# Patient Record
(demographics unavailable — no encounter records)

---

## 2023-08-08 NOTE — XMS
Continuity of Care Document
  Created on: 2023
 
 Lani Pinzon
 External Reference #: [id number]
 : 71
 Sex: Female
 
 Demographics
 
 
+-----------------------+------------------------+
| Address               | [address]  |
|                       | [place]  [id number]   |
+-----------------------+------------------------+
| Preferred Language    | Unknown                |
+-----------------------+------------------------+
| Marital Status        | Never           |
+-----------------------+------------------------+
| [employer] Affiliation | Unknown                |
+-----------------------+------------------------+
| Race                  | White                  |
+-----------------------+------------------------+
| Ethnic Group          | Not  or  |
+-----------------------+------------------------+
 
 
 Author
 
 
+--------------+--------------------------+
| Author       | [facility]                 |
+--------------+--------------------------+
| Organization | [facility]                 |
+--------------+--------------------------+
| Address      | [address] |
|              | [place]  [address]     |
+--------------+--------------------------+
| Phone        | [phone]          |
+--------------+--------------------------+
 
 
 
 Care Team Providers
 
 
+-----------------------+-------------+-------------+
| Care Team Member Name | Role        | Phone       |
+-----------------------+-------------+-------------+
 Unavailable | Unavailable |
+-----------------------+-------------+-------------+
 Unavailable | Unavailable |
+-----------------------+-------------+-------------+
 Unavailable | Unavailable |
+-----------------------+-------------+-------------+
 Unavailable | Unavailable |
+-----------------------+-------------+-------------+
 
 
 
 
 Allergies and Intolerances
 
 
+-------------+-----------------+------------+-----------------+-----------------+
|  date       |  description    |  facility  |  reaction       |  severity       |
+-------------+-----------------+------------+-----------------+-----------------+
|  (no date)  |  Aspirin        |  [facility]   |  (no reaction)  |  (no severity)  |
|             |                 | [facility]    |                 |                 |
|             |                 | Hospital   |                 |                 |
+-------------+-----------------+------------+-----------------+-----------------+
|  (no date)  |  aspirin        |  [facility]   |  (no reaction)  |  (no severity)  |
|             |                 | [facility]    |                 |                 |
|             |                 | Hospital   |                 |                 |
+-------------+-----------------+------------+-----------------+-----------------+
|  (no date)  |  Urticaria      |  [facility]   |  (no reaction)  |  (no severity)  |
|             |                 | [facility]    |                 |                 |
|             |                 | Hospital   |                 |                 |
+-------------+-----------------+------------+-----------------+-----------------+
|  (no date)  |  Clindamycin    |  [facility]   |  (no reaction)  |  (no severity)  |
|             |                 | [facility]    |                 |                 |
|             |                 | Hospital   |                 |                 |
+-------------+-----------------+------------+-----------------+-----------------+
|  (no date)  |  clindamycin    |  [facility]   |  (no reaction)  |  (no severity)  |
|             |                 | [facility]    |                 |                 |
|             |                 | Hospital   |                 |                 |
+-------------+-----------------+------------+-----------------+-----------------+
|  (no date)  |  Hydromorphone  |  [facility]   |  (no reaction)  |  (no severity)  |
|             |                 | [facility]    |                 |                 |
|             |                 | Hospital   |                 |                 |
+-------------+-----------------+------------+-----------------+-----------------+
|  (no date)  |  Clindamycin    |  [facility]   |  (no reaction)  |  (no severity)  |
|             |                 | [facility]    |                 |                 |
|             |                 | Hospital   |                 |                 |
+-------------+-----------------+------------+-----------------+-----------------+
|  (no date)  |  Aspirin        |  [facility]   |  (no reaction)  |  (no severity)  |
|             |                 | [facility]    |                 |                 |
|             |                 | Hospital   |                 |                 |
+-------------+-----------------+------------+-----------------+-----------------+
|  (no date)  |  Clindamycin    |  [facility]   |  (no reaction)  |  (no severity)  |
|             |                 | [facility]    |                 |                 |
|             |                 | Hospital   |                 |                 |
+-------------+-----------------+------------+-----------------+-----------------+
|  (no date)  |  Hydromorphone  |  [facility]   |  (no reaction)  |  (no severity)  |
|             |                 | [facility]    |                 |                 |
|             |                 | Hospital   |                 |                 |
+-------------+-----------------+------------+-----------------+-----------------+
|  (no date)  |  Penicillin     |  [facility]   |  (no reaction)  |  (no severity)  |
|             |                 | [facility]    |                 |                 |
|             |                 | Hospital   |                 |                 |
+-------------+-----------------+------------+-----------------+-----------------+
|  (no date)  |  Penicillin     |  [facility]   |  (no reaction)  |  (no severity)  |
|             |                 | [facility]    |                 |                 |
|             |                 | Hospital   |                 |                 |
+-------------+-----------------+------------+-----------------+-----------------+
|  (no date)  |  Penicillins    |  [facility]       |  (no reaction)  |  (no severity)  |
+-------------+-----------------+------------+-----------------+-----------------+
|  (no date)  |  Sulfa          |  [facility]       |  (no reaction)  |  (no severity)  |
|             | (Sulfonamide    |            |                 |                 |
 
|             | Antibiotics)    |            |                 |                 |
+-------------+-----------------+------------+-----------------+-----------------+
|  (no date)  |  aspirin        |  [facility]       |  (no reaction)  |  (no severity)  |
+-------------+-----------------+------------+-----------------+-----------------+
|  (no date)  |  clindamycin    |  [facility]       |  (no reaction)  |  (no severity)  |
+-------------+-----------------+------------+-----------------+-----------------+
|  (no date)  |  hydromorphone  |  [facility]       |  (no reaction)  |  (no severity)  |
+-------------+-----------------+------------+-----------------+-----------------+
|  (no date)  |  Penicillin     |  [facility]   |  (no reaction)  |  (no severity)  |
|             |                 | [facility]    |                 |                 |
|             |                 | Hospital   |                 |                 |
+-------------+-----------------+------------+-----------------+-----------------+
|  (no date)  |  NARCOTICS      |  [facility]       |  (no reaction)  |  (no severity)  |
+-------------+-----------------+------------+-----------------+-----------------+
|  (no date)  |  Penicillin     |  [facility]   |  (no reaction)  |  (no severity)  |
|             |                 | [facility]    |                 |                 |
|             |                 | Hospital   |                 |                 |
+-------------+-----------------+------------+-----------------+-----------------+
|  (no date)  |  Hydromorphone  |  [facility]   |  (no reaction)  |  (no severity)  |
|             |                 | [facility]    |                 |                 |
|             |                 | Hospital   |                 |                 |
+-------------+-----------------+------------+-----------------+-----------------+
|  (no date)  |  Aspirin        |  [facility]   |  (no reaction)  |  (no severity)  |
|             |                 | [facility]    |                 |                 |
|             |                 | Hospital   |                 |                 |
+-------------+-----------------+------------+-----------------+-----------------+
 
 
 
 Encounters
 No information.
 
 Functional Status
 No information.
 
 Immunizations
 No information.
 
 Medications
 
 
+--------------------+--------------------------+----------------------------+
|  date              |  description             |  facility                  |
+--------------------+--------------------------+----------------------------+
|  2023 00:00  |  OXYMETAZOLINE HCL       |  [facility]  |
+--------------------+--------------------------+----------------------------+
|  2023 00:00  |  OXYMETAZOLINE HCL       |  [facility]  |
+--------------------+--------------------------+----------------------------+
|  2023 00:00  |  Fluticasone Furoate     |  [facility]  |
+--------------------+--------------------------+----------------------------+
|  2023 00:00  |  Fluticasone Furoate     |  [facility]  |
+--------------------+--------------------------+----------------------------+
|  2022 00:00  |  NITROFURANTOIN MONOHYD  |  [facility]  |
|                    | MACROCR                  |                            |
+--------------------+--------------------------+----------------------------+
|  2023 00:00  |  LORATADINE              |  [facility]  |
+--------------------+--------------------------+----------------------------+
|  2023 00:00  |  LORATADINE              |  [facility]  |
+--------------------+--------------------------+----------------------------+
|  2022 00:00  |  HYDROCODONE             |  [facility]  |
 
|                    | BIT/ACETAMINOPHEN        |                            |
+--------------------+--------------------------+----------------------------+
 
 
 
 Problems
 
 
+--------------------+-----------------------------+----------------------------+
|  date              |  description                |  facility                  |
+--------------------+-----------------------------+----------------------------+
|  2021 00:00  |  Acute gastroenteritis      |  [facility]  |
+--------------------+-----------------------------+----------------------------+
|  2021 00:00  |  Acute gastroenteritis      |  [facility]  |
+--------------------+-----------------------------+----------------------------+
|  2022 18:20  |  PRIMARY OSTEOARTHRITIS,    |  [facility]                       |
|                    | LEFT ANKLE AND FOOT         |                            |
+--------------------+-----------------------------+----------------------------+
|  2022 18:20  |  PAIN IN LEFT ANKLE AND     |  [facility]                       |
|                    | JOINTS OF LEFT FOOT         |                            |
+--------------------+-----------------------------+----------------------------+
|  2022 00:00  |  Urinary tract infection    |  [facility]  |
+--------------------+-----------------------------+----------------------------+
|  2022 00:00  |  Urinary tract infection    |  [facility]  |
+--------------------+-----------------------------+----------------------------+
|  2022 20:48  |  NICOTINE DEPENDENCE,       |  [facility]                       |
|                    | UNSPECIFIED, UNCOMPLICATED  |                            |
+--------------------+-----------------------------+----------------------------+
|  2022 20:48  |  URINARY TRACT INFECTION,   |  [facility]                       |
|                    | SITE NOT SPECIFIED          |                            |
+--------------------+-----------------------------+----------------------------+
|  2022 20:48  |  DYSURIA                    |  [facility]                       |
+--------------------+-----------------------------+----------------------------+
|  2022 20:48  |  ALLERGY STATUS TO          |  [facility]                       |
|                    | PENICILLIN                  |                            |
+--------------------+-----------------------------+----------------------------+
|  2022 20:48  |  ALLERGY STATUS TO OTHER    |  [facility]                       |
|                    | ANTIBIOTIC AGENTS STATUS    |                            |
+--------------------+-----------------------------+----------------------------+
|  2022 20:48  |  ALLERGY STATUS TO OTH      |  [facility]                       |
|                    | DRUG/MEDS/BIOL SUBST STATUS |                            |
|                    |                             |                            |
+--------------------+-----------------------------+----------------------------+
|  2022-10-20 09:00  |  ENCNTR SCREEN MAMMOGRAM    |  [facility]                       |
|                    | FOR MALIGNANT NE            |                            |
+--------------------+-----------------------------+----------------------------+
|  2023 15:46  |  ENCOUNTER FOR OTHER        |  [facility]                       |
|                    | PREPROCEDURAL EXAMINATION   |                            |
+--------------------+-----------------------------+----------------------------+
|  2023 15:46  |  FAMILY HISTORY OF COLONIC  |  [facility]                       |
|                    | POLYPS                      |                            |
+--------------------+-----------------------------+----------------------------+
|  2023 05:45  |  NICOTINE DEPENDENCE,       |  [facility]                       |
|                    | UNSPECIFIED, UNCOMP         |                            |
+--------------------+-----------------------------+----------------------------+
|  2023 05:45  |  DVRTCLOS OF LG INT W/O     |  [facility]                       |
|                    | PERFORATION OR ABSCESS W/O  |                            |
+--------------------+-----------------------------+----------------------------+
|  2023 05:45  |  RESIDUAL HEMORRHOIDAL SKIN |  [facility]                       |
|                    |  TAGS                       |                            |
 
+--------------------+-----------------------------+----------------------------+
|  2023 05:45  |  OTHER HEMORRHOIDS          |  [facility]                       |
+--------------------+-----------------------------+----------------------------+
|  2023 05:45  |  URTICARIA, UNSPECIFIED     |  [facility]                       |
+--------------------+-----------------------------+----------------------------+
|  2023 05:45  |  OTHER SPECIFIED CONGENITAL |  [facility]                       |
|                    |  MALFORMATIONS OF INTEST    |                            |
+--------------------+-----------------------------+----------------------------+
|  2023 05:45  |  DYSLEXIA AND ALEXIA        |  [facility]                       |
+--------------------+-----------------------------+----------------------------+
|  2023 05:45  |  ENCOUNTER FOR SCREENING    |  [facility]                       |
|                    | FOR MALIGNANT NEOPLASM OF   |                            |
|                    | COLON                       |                            |
+--------------------+-----------------------------+----------------------------+
|  2023 05:45  |  OTHER LONG TERM (CURRENT)  |  [facility]                       |
|                    | DRUG THERAPY                |                            |
+--------------------+-----------------------------+----------------------------+
|  2023 05:45  |  FAMILY HISTORY OF          |  [facility]                       |
|                    | MALIGNANT NEOPLASM OF       |                            |
|                    | DIGESTIVE                   |                            |
+--------------------+-----------------------------+----------------------------+
|  2023 05:45  |  FAMILY HISTORY OF COLONIC  |  [facility]                       |
|                    | POLYPS                      |                            |
+--------------------+-----------------------------+----------------------------+
|  2023 05:45  |  ALLERGY STATUS TO OTHER    |  [facility]                       |
|                    | ANTIBIOTIC AGENTS STATUS    |                            |
+--------------------+-----------------------------+----------------------------+
|  2023 05:45  |  ALLERGY STATUS TO          |  [facility]                       |
|                    | SULFONAMIDES STATUS         |                            |
+--------------------+-----------------------------+----------------------------+
|  2023 05:45  |  ALLERGY STATUS TO OTH      |  [facility]                       |
|                    | DRUG/MEDS/BIOL SUBST STATUS |                            |
|                    |                             |                            |
+--------------------+-----------------------------+----------------------------+
 
 
 
 Procedures
 
 
+--------------------+---------------+----------------------------+
|  date              |  description  |  facility                  |
+--------------------+---------------+----------------------------+
|  2023 00:00  |  Colonoscopy  |  [facility]  |
+--------------------+---------------+----------------------------+
|  2023 00:00  |  Colonoscopy  |  [facility]  |
+--------------------+---------------+----------------------------+
 
 
 
 Results/Labs
 
 
+--------+--------+------------+---------+--------+---------+
|  test  |  date  |  facility  |  value  |  unit  |  notes  |
+--------+--------+------------+---------+--------+---------+
 
 
 
+------------------+
 
|  Result panel 1  |
+------------------+
 
 
 
+--------------+--------------+-----------+-------+-------------+-------------+
|              |  2022  |  [facility]  |  RED  |  (missing)  |  (missing)  |
| (unavailable | 21:00        | [facility]   |       |             |             |
| )            |              | Hospital  |       |             |             |
+--------------+--------------+-----------+-------+-------------+-------------+
 
 
 
+------------------+
|  Result panel 2  |
+------------------+
 
 
 
+--------------+--------------+-----------+-------------+-------------+-------------+
|              |  2022  |  [facility]  |  SL CLOUDY  |  (missing)  |  (missing)  |
| (unavailable | 21:00        | [facility]   |             |             |             |
| )            |              | Hospital  |             |             |             |
+--------------+--------------+-----------+-------------+-------------+-------------+
 
 
 
+------------------+
|  Result panel 3  |
+------------------+
 
 
 
+--------------+--------------+-----------+------------+-------------+-------------+
|              |  2022  |  [facility]  |  MODERATE  |  (missing)  |  (missing)  |
| (unavailable | 21:00        | [facility]   |            |             |             |
| )            |              | Hospital  |            |             |             |
+--------------+--------------+-----------+------------+-------------+-------------+
 
 
 
+------------------+
|  Result panel 4  |
+------------------+
 
 
 
+--------------+--------------+-----------+------------+-------------+-------------+
|              |  2022  |  [facility]  |  NEGATIVE  |  (missing)  |  (missing)  |
| (unavailable | 21:00        | [facility]   |            |             |             |
| )            |              | Hospital  |            |             |             |
+--------------+--------------+-----------+------------+-------------+-------------+
 
 
 
+------------------+
|  Result panel 5  |
+------------------+
 
 
 
 
+--------------+--------------+-----------+---------+-------------+-------------+
|              |  2022  |  [facility]  |  TRACE  |  (missing)  |  (missing)  |
| (unavailable | 21:00        | [facility]   |         |             |             |
| )            |              | Hospital  |         |             |             |
+--------------+--------------+-----------+---------+-------------+-------------+
 
 
 
+------------------+
|  Result panel 6  |
+------------------+
 
 
 
+--------------+--------------+-----------+---------+-------------+-------------+
|              |  2022  |  [facility]  |  1.025  |  (missing)  |  (missing)  |
| (unavailable | 21:00        | [facility]   |         |             |             |
| )            |              | Hospital  |         |             |             |
+--------------+--------------+-----------+---------+-------------+-------------+
 
 
 
+------------------+
|  Result panel 7  |
+------------------+
 
 
 
+--------------+--------------+-----------+-----------+-------------+-------------+
|              |  2022  |  [facility]  |  TRACE-I  |  (missing)  |  (missing)  |
| (unavailable | 21:00        | [facility]   |           |             |             |
| )            |              | Hospital  |           |             |             |
+--------------+--------------+-----------+-----------+-------------+-------------+
 
 
 
+------------------+
|  Result panel 8  |
+------------------+
 
 
 
+--------------+--------------+-----------+-------+-------------+-------------+
|              |  2022  |  [facility]  |  5.0  |  (missing)  |  (missing)  |
| (unavailable | 21:00        | [facility]   |       |             |             |
| )            |              | Hospital  |       |             |             |
+--------------+--------------+-----------+-------+-------------+-------------+
 
 
 
+------------------+
|  Result panel 9  |
+------------------+
 
 
 
+--------------+--------------+-----------+---------+-------------+-------------+
|              |  2022  |  [facility]  |  >=300  |  (missing)  |  (missing)  |
| (unavailable | 21:00        | [facility]   |         |             |             |
 
| )            |              | Hospital  |         |             |             |
+--------------+--------------+-----------+---------+-------------+-------------+
 
 
 
+-------------------+
|  Result panel 10  |
+-------------------+
 
 
 
+--------------+--------------+-----------+---------+-------------+-------------+
|              |  2022  |  [facility]  |  >=8.0  |  (missing)  |  (missing)  |
| (unavailable | 21:00        | [facility]   |         |             |             |
| )            |              | Hospital  |         |             |             |
+--------------+--------------+-----------+---------+-------------+-------------+
 
 
 
+-------------------+
|  Result panel 11  |
+-------------------+
 
 
 
+--------------+--------------+-----------+------------+-------------+-------------+
|              |  2022  |  [facility]  |  POSITIVE  |  (missing)  |  (missing)  |
| (unavailable | 21:00        | [facility]   |            |             |             |
| )            |              | Hospital  |            |             |             |
+--------------+--------------+-----------+------------+-------------+-------------+
 
 
 
+-------------------+
|  Result panel 12  |
+-------------------+
 
 
 
+--------------+--------------+-----------+---------+-------------+-------------+
|              |  2022  |  [facility]  |  SMALL  |  (missing)  |  (missing)  |
| (unavailable | 21:00        | [facility]   |         |             |             |
| )            |              | Hospital  |         |             |             |
+--------------+--------------+-----------+---------+-------------+-------------+
 
 
 
+-------------------+
|  Result panel 13  |
+-------------------+
 
 
 
+--------------+--------------+-----------+-------+-------------+-------------+
|              |  2022  |  [facility]  |  2-3  |  (missing)  |  (missing)  |
| (unavailable | 21:00        | [facility]   |       |             |             |
| )            |              | Hospital  |       |             |             |
+--------------+--------------+-----------+-------+-------------+-------------+
 
 
 
 
+-------------------+
|  Result panel 14  |
+-------------------+
 
 
 
+--------------+--------------+-----------+---------+-------------+-------------+
|              |  2022  |  [facility]  |  12-20  |  (missing)  |  (missing)  |
| (unavailable | 21:00        | [facility]   |         |             |             |
| )            |              | Hospital  |         |             |             |
+--------------+--------------+-----------+---------+-------------+-------------+
 
 
 
+-------------------+
|  Result panel 15  |
+-------------------+
 
 
 
+--------------+--------------+-----------+--------------+-------------+-------------+
|              |  2022  |  [facility]  |  SQUAMOUS 2+ |  (missing)  |  (missing)  |
| (unavailable | 21:00        | [facility]   |              |             |             |
| )            |              | Hospital  |              |             |             |
+--------------+--------------+-----------+--------------+-------------+-------------+
 
 
 
+-------------------+
|  Result panel 16  |
+-------------------+
 
 
 
+--------------+--------------+-----------+-------------+-------------+-------------+
|              |  2022  |  [facility]  |  NONE SEEN  |  (missing)  |  (missing)  |
| (unavailable | 21:00        | [facility]   |             |             |             |
| )            |              | Hospital  |             |             |             |
+--------------+--------------+-----------+-------------+-------------+-------------+
 
 
 
+-------------------+
|  Result panel 17  |
+-------------------+
 
 
 
+--------------+--------------+-----------+--------+-------------+-------------+
|              |  2022  |  [facility]  |  RARE  |  (missing)  |  (missing)  |
| (unavailable | 21:00        | [facility]   |        |             |             |
| )            |              | Hospital  |        |             |             |
+--------------+--------------+-----------+--------+-------------+-------------+
 
 
 
+-------------------+
|  Result panel 18  |
+-------------------+
 
 
 
 
+--------------+--------------+-----------+-------------+-------------+-------------+
|              |  2022  |  [facility]  |  NONE SEEN  |  (missing)  |  (missing)  |
| (unavailable | 21:00        | [facility]   |             |             |             |
| )            |              | Hospital  |             |             |             |
+--------------+--------------+-----------+-------------+-------------+-------------+
 
 
 
+-------------------+
|  Result panel 19  |
+-------------------+
 
 
 
+--------------+--------------+-----------+-------+-------------+-------------+
|              |  2022  |  [facility]  |  Yes  |  (missing)  |  (missing)  |
| (unavailable | 21:00        | [facility]   |       |             |             |
| )            |              | Hospital  |       |             |             |
+--------------+--------------+-----------+-------+-------------+-------------+
 
 
 
+-------------------+
|  Result panel 20  |
+-------------------+
 
 
 
+--------------+--------------+-----------+--------------+-------------+-------------+
|              |  2022  |  [facility]  |  CLEAN CATCH |  (missing)  |  (missing)  |
| (unavailable | 21:00        | [facility]   |              |             |             |
| )            |              | Hospital  |              |             |             |
+--------------+--------------+-----------+--------------+-------------+-------------+
 
 
 
+-------------------+
|  Result panel 21  |
+-------------------+
 
 
 
+--------------+--------------+-----------+--------+-------------+-------------+
|              |  2023  |  [facility]  |  10.0  |  (missing)  |  (missing)  |
| (unavailable | 16:53:07     | [facility]   |        |             |             |
| )            |              | Hospital  |        |             |             |
+--------------+--------------+-----------+--------+-------------+-------------+
 
 
 
+-------------------+
|  Result panel 22  |
+-------------------+
 
 
 
+--------------+--------------+-----------+--------+-------------+-------------+
 
|              |  2023  |  [facility]  |  65.6  |  (missing)  |  (missing)  |
| (unavailable | 16:53:07     | [facility]   |        |             |             |
| )            |              | Hospital  |        |             |             |
+--------------+--------------+-----------+--------+-------------+-------------+
 
 
 
+-------------------+
|  Result panel 23  |
+-------------------+
 
 
 
+--------------+--------------+-----------+--------+-------------+-------------+
|              |  2023  |  [facility]  |  23.3  |  (missing)  |  (missing)  |
| (unavailable | 16:53:07     | [facility]   |        |             |             |
| )            |              | Hospital  |        |             |             |
+--------------+--------------+-----------+--------+-------------+-------------+
 
 
 
+-------------------+
|  Result panel 24  |
+-------------------+
 
 
 
+--------------+--------------+-----------+-------+-------------+-------------+
|              |  2023  |  [facility]  |  9.4  |  (missing)  |  (missing)  |
| (unavailable | 16:53:07     | [facility]   |       |             |             |
| )            |              | Hospital  |       |             |             |
+--------------+--------------+-----------+-------+-------------+-------------+
 
 
 
+-------------------+
|  Result panel 25  |
+-------------------+
 
 
 
+--------------+--------------+-----------+-------+-------------+-------------+
|              |  2023  |  [facility]  |  1.2  |  (missing)  |  (missing)  |
| (unavailable | 16:53:07     | [facility]   |       |             |             |
| )            |              | Hospital  |       |             |             |
+--------------+--------------+-----------+-------+-------------+-------------+
 
 
 
+-------------------+
|  Result panel 26  |
+-------------------+
 
 
 
+--------------+--------------+-----------+-------+-------------+-------------+
|              |  2023  |  [facility]  |  0.5  |  (missing)  |  (missing)  |
| (unavailable | 16:53:07     | [facility]   |       |             |             |
| )            |              | Hospital  |       |             |             |
+--------------+--------------+-----------+-------+-------------+-------------+
 
 
 
 
+-------------------+
|  Result panel 27  |
+-------------------+
 
 
 
+--------------+--------------+-----------+------+---------+-------------+
|              |  2023  |  [facility]  |  88  |  mg/dL  |  (missing)  |
| (unavailable | 16:53:07     | [facility]   |      |         |             |
| )            |              | Hospital  |      |         |             |
+--------------+--------------+-----------+------+---------+-------------+
 
 
 
+-------------------+
|  Result panel 28  |
+-------------------+
 
 
 
+--------------+--------------+-----------+------+---------+-------------+
|              |  2023  |  [facility]  |  15  |  mg/dL  |  (missing)  |
| (unavailable | 16:53:07     | [facility]   |      |         |             |
| )            |              | Hospital  |      |         |             |
+--------------+--------------+-----------+------+---------+-------------+
 
 
 
+-------------------+
|  Result panel 29  |
+-------------------+
 
 
 
+--------------+--------------+-----------+--------+---------+-------------+
|              |  2023  |  [facility]  |  1.15  |  mg/dL  |  (missing)  |
| (unavailable | 16:53:07     | [facility]   |        |         |             |
| )            |              | Hospital  |        |         |             |
+--------------+--------------+-----------+--------+---------+-------------+
 
 
 
+-------------------+
|  Result panel 30  |
+-------------------+
 
 
 
+--------------+--------------+-----------+------+-------------+-------------+
|              |  2023  |  [facility]  |  58  |  (missing)  |  (missing)  |
| (unavailable | 16:53:07     | [facility]   |      |             |             |
| )            |              | Hospital  |      |             |             |
+--------------+--------------+-----------+------+-------------+-------------+
 
 
 
+-------------------+
 
|  Result panel 31  |
+-------------------+
 
 
 
+--------------+--------------+-----------+---------+-------------+-------------+
|              |  2023  |  [facility]  |  13.04  |  (missing)  |  (missing)  |
| (unavailable | 16:53:07     | [facility]   |         |             |             |
| )            |              | Hospital  |         |             |             |
+--------------+--------------+-----------+---------+-------------+-------------+
 
 
 
+-------------------+
|  Result panel 32  |
+-------------------+
 
 
 
+--------------+--------------+-----------+--------+-------------+-------------+
|              |  2023  |  [facility]  |  4.27  |  (missing)  |  (missing)  |
| (unavailable | 16:53:07     | [facility]   |        |             |             |
| )            |              | Hospital  |        |             |             |
+--------------+--------------+-----------+--------+-------------+-------------+
 
 
 
+-------------------+
|  Result panel 33  |
+-------------------+
 
 
 
+--------------+--------------+-----------+-------+-------------+-------------+
|              |  2023  |  [facility]  |  143  |  (missing)  |  (missing)  |
| (unavailable | 16:53:07     | [facility]   |       |             |             |
| )            |              | Hospital  |       |             |             |
+--------------+--------------+-----------+-------+-------------+-------------+
 
 
 
+-------------------+
|  Result panel 34  |
+-------------------+
 
 
 
+--------------+--------------+-----------+-------+-------------+-------------+
|              |  2023  |  [facility]  |  3.5  |  (missing)  |  (missing)  |
| (unavailable | 16:53:07     | [facility]   |       |             |             |
| )            |              | Hospital  |       |             |             |
+--------------+--------------+-----------+-------+-------------+-------------+
 
 
 
+-------------------+
|  Result panel 35  |
+-------------------+
 
 
 
 
+--------------+--------------+-----------+-------+-------------+-------------+
|              |  2023  |  [facility]  |  103  |  (missing)  |  (missing)  |
| (unavailable | 16:53:07     | [facility]   |       |             |             |
| )            |              | Hospital  |       |             |             |
+--------------+--------------+-----------+-------+-------------+-------------+
 
 
 
+-------------------+
|  Result panel 36  |
+-------------------+
 
 
 
+--------------+--------------+-----------+------+-------------+-------------+
|              |  2023  |  [facility]  |  29  |  (missing)  |  (missing)  |
| (unavailable | 16:53:07     | [facility]   |      |             |             |
| )            |              | Hospital  |      |             |             |
+--------------+--------------+-----------+------+-------------+-------------+
 
 
 
+-------------------+
|  Result panel 37  |
+-------------------+
 
 
 
+--------------+--------------+-----------+--------+-------------+-------------+
|              |  2023  |  [facility]  |  14.5  |  (missing)  |  (missing)  |
| (unavailable | 16:53:07     | [facility]   |        |             |             |
| )            |              | Hospital  |        |             |             |
+--------------+--------------+-----------+--------+-------------+-------------+
 
 
 
+-------------------+
|  Result panel 38  |
+-------------------+
 
 
 
+--------------+--------------+-----------+-------+---------+-------------+
|              |  2023  |  [facility]  |  8.7  |  mg/dL  |  (missing)  |
| (unavailable | 16:53:07     | [facility]   |       |         |             |
| )            |              | Hospital  |       |         |             |
+--------------+--------------+-----------+-------+---------+-------------+
 
 
 
+-------------------+
|  Result panel 39  |
+-------------------+
 
 
 
+--------------+--------------+-----------+-------+-------------+-------------+
|              |  2023  |  [facility]  |  7.6  |  (missing)  |  (missing)  |
| (unavailable | 16:53:07     | [facility]   |       |             |             |
 
| )            |              | Hospital  |       |             |             |
+--------------+--------------+-----------+-------+-------------+-------------+
 
 
 
+-------------------+
|  Result panel 40  |
+-------------------+
 
 
 
+--------------+--------------+-----------+-------+-------------+-------------+
|              |  2023  |  [facility]  |  3.9  |  (missing)  |  (missing)  |
| (unavailable | 16:53:07     | [facility]   |       |             |             |
| )            |              | Hospital  |       |             |             |
+--------------+--------------+-----------+-------+-------------+-------------+
 
 
 
+-------------------+
|  Result panel 41  |
+-------------------+
 
 
 
+--------------+--------------+-----------+-------+-------------+-------------+
|              |  2023  |  [facility]  |  3.7  |  (missing)  |  (missing)  |
| (unavailable | 16:53:07     | [facility]   |       |             |             |
| )            |              | Hospital  |       |             |             |
+--------------+--------------+-----------+-------+-------------+-------------+
 
 
 
+-------------------+
|  Result panel 42  |
+-------------------+
 
 
 
+--------------+--------------+-----------+--------+-------------+-------------+
|              |  2023  |  [facility]  |  1.05  |  (missing)  |  (missing)  |
| (unavailable | 16:53:07     | [facility]   |        |             |             |
| )            |              | Hospital  |        |             |             |
+--------------+--------------+-----------+--------+-------------+-------------+
 
 
 
+-------------------+
|  Result panel 43  |
+-------------------+
 
 
 
+--------------+--------------+-----------+--------+-------------+-------------+
|              |  2023  |  [facility]  |  13.4  |  (missing)  |  (missing)  |
| (unavailable | 16:53:07     | [facility]   |        |             |             |
| )            |              | Hospital  |        |             |             |
+--------------+--------------+-----------+--------+-------------+-------------+
 
 
 
 
+-------------------+
|  Result panel 44  |
+-------------------+
 
 
 
+--------------+--------------+-----------+-------+-------------+-------------+
|              |  2023  |  [facility]  |  0.4  |  (missing)  |  (missing)  |
| (unavailable | 16:53:07     | [facility]   |       |             |             |
| )            |              | Hospital  |       |             |             |
+--------------+--------------+-----------+-------+-------------+-------------+
 
 
 
+-------------------+
|  Result panel 45  |
+-------------------+
 
 
 
+--------------+--------------+-----------+------+-------------+-------------+
|              |  2023  |  [facility]  |  18  |  (missing)  |  (missing)  |
| (unavailable | 16:53:07     | [facility]   |      |             |             |
| )            |              | Hospital  |      |             |             |
+--------------+--------------+-----------+------+-------------+-------------+
 
 
 
+-------------------+
|  Result panel 46  |
+-------------------+
 
 
 
+--------------+--------------+-----------+------+-------------+-------------+
|              |  2023  |  [facility]  |  30  |  (missing)  |  (missing)  |
| (unavailable | 16:53:07     | [facility]   |      |             |             |
| )            |              | Hospital  |      |             |             |
+--------------+--------------+-----------+------+-------------+-------------+
 
 
 
+-------------------+
|  Result panel 47  |
+-------------------+
 
 
 
+--------------+--------------+-----------+------+-------------+-------------+
|              |  2023  |  [facility]  |  97  |  (missing)  |  (missing)  |
| (unavailable | 16:53:07     | [facility]   |      |             |             |
| )            |              | Hospital  |      |             |             |
+--------------+--------------+-----------+------+-------------+-------------+
 
 
 
+-------------------+
|  Result panel 48  |
+-------------------+
 
 
 
 
+--------------+--------------+-----------+--------+-------------+-------------+
|              |  2023  |  [facility]  |  39.1  |  (missing)  |  (missing)  |
| (unavailable | 16:53:07     | [facility]   |        |             |             |
| )            |              | Hospital  |        |             |             |
+--------------+--------------+-----------+--------+-------------+-------------+
 
 
 
+-------------------+
|  Result panel 49  |
+-------------------+
 
 
 
+--------------+--------------+-----------+--------+-------------+-------------+
|              |  2023  |  [facility]  |  91.6  |  (missing)  |  (missing)  |
| (unavailable | 16:53:07     | [facility]   |        |             |             |
| )            |              | Hospital  |        |             |             |
+--------------+--------------+-----------+--------+-------------+-------------+
 
 
 
+-------------------+
|  Result panel 50  |
+-------------------+
 
 
 
+--------------+--------------+-----------+--------+-------------+-------------+
|              |  2023  |  [facility]  |  31.5  |  (missing)  |  (missing)  |
| (unavailable | 16:53:07     | [facility]   |        |             |             |
| )            |              | Hospital  |        |             |             |
+--------------+--------------+-----------+--------+-------------+-------------+
 
 
 
+-------------------+
|  Result panel 51  |
+-------------------+
 
 
 
+--------------+--------------+-----------+--------+-------------+-------------+
|              |  2023  |  [facility]  |  34.3  |  (missing)  |  (missing)  |
| (unavailable | 16:53:07     | [facility]   |        |             |             |
| )            |              | Hospital  |        |             |             |
+--------------+--------------+-----------+--------+-------------+-------------+
 
 
 
+-------------------+
|  Result panel 52  |
+-------------------+
 
 
 
+--------------+--------------+-----------+--------+-------------+-------------+
 
|              |  2023  |  [facility]  |  13.1  |  (missing)  |  (missing)  |
| (unavailable | 16:53:07     | [facility]   |        |             |             |
| )            |              | Hospital  |        |             |             |
+--------------+--------------+-----------+--------+-------------+-------------+
 
 
 
+-------------------+
|  Result panel 53  |
+-------------------+
 
 
 
+--------------+--------------+-----------+-------+-------------+-------------+
|              |  2023  |  [facility]  |  329  |  (missing)  |  (missing)  |
| (unavailable | 16:53:07     | [facility]   |       |             |             |
| )            |              | Hospital  |       |             |             |
+--------------+--------------+-----------+-------+-------------+-------------+
 
 
 
 Social History
 No information.
 
 Vital Signs
 
 
+--------------------+---------------------+----------+---------+
|  date              |  measurement        |  value   |  units  |
+--------------------+---------------------+----------+---------+
|  2022 00:00  |  BMI                |  31.2    |  kg/m2  |
+--------------------+---------------------+----------+---------+
|  2022 00:00  |  BP_diastolic       |  51      |  mmHg   |
+--------------------+---------------------+----------+---------+
|  2022 00:00  |  BP_systolic        |  106     |  mmHg   |
+--------------------+---------------------+----------+---------+
|  2022 00:00  |  heart_rate         |  71      |  /min   |
+--------------------+---------------------+----------+---------+
|  2022 00:00  |  height_metric      |  152.4   |  cm     |
+--------------------+---------------------+----------+---------+
|  2022 00:00  |  height_standard    |  60      |  in     |
+--------------------+---------------------+----------+---------+
|  2022 00:00  |  o2_saturation      |  96      |  %      |
+--------------------+---------------------+----------+---------+
|  2022 00:00  |  respiration_rate   |  16      |  /min   |
+--------------------+---------------------+----------+---------+
|  2022 00:00  |  temperature_metric |  37.22   |  C      |
|                    |                     |          |         |
+--------------------+---------------------+----------+---------+
|  2022 00:00  |                     |  99      |  F      |
|                    | temperature_standar |          |         |
|                    | d                   |          |         |
+--------------------+---------------------+----------+---------+
|  2022 00:00  |  weight_metric      |  72.57   |  kg     |
+--------------------+---------------------+----------+---------+
|  2022 00:00  |  weight_standard    |  159.99  |  lb     |
+--------------------+---------------------+----------+---------+
|  2022 00:00  |  weight_standard    |  160     |  lb     |
+--------------------+---------------------+----------+---------+
|  2023 00:00  |  BMI                |  25.9    |  kg/m2  |
 
+--------------------+---------------------+----------+---------+
|  2023 00:00  |  height_metric      |  167.64  |  cm     |
+--------------------+---------------------+----------+---------+
|  2023 00:00  |  height_standard    |  66      |  in     |
+--------------------+---------------------+----------+---------+
|  2023 00:00  |  weight_metric      |  72.7    |  kg     |
+--------------------+---------------------+----------+---------+
|  2023 00:00  |  weight_standard    |  160.28  |  lb     |
+--------------------+---------------------+----------+---------+
|  2023 00:00  |  BP_diastolic       |  51      |  mmHg   |
+--------------------+---------------------+----------+---------+
|  2023 00:00  |  BP_systolic        |  110     |  mmHg   |
+--------------------+---------------------+----------+---------+
|  2023 00:00  |  heart_rate         |  69      |  /min   |
+--------------------+---------------------+----------+---------+
|  2023 00:00  |  o2_saturation      |  100     |  %      |
+--------------------+---------------------+----------+---------+
|  2023 00:00  |  respiration_rate   |  14      |  /min   |
+--------------------+---------------------+----------+---------+
|  2023 00:00  |  temperature_metric |  36.89   |  C      |
|                    |                     |          |         |
+--------------------+---------------------+----------+---------+
|  2023 00:00  |                     |  98.4    |  F      |
|                    | temperature_standar |          |         |
|                    | d                   |          |         |
+--------------------+---------------------+----------+---------+
|  2023 00:00  |  BMI                |  26.7    |  kg/m2  |
+--------------------+---------------------+----------+---------+
|  2023 00:00  |  BP_diastolic       |  84      |  mmHg   |
+--------------------+---------------------+----------+---------+
|  2023 00:00  |  BP_systolic        |  103     |  mmHg   |
+--------------------+---------------------+----------+---------+
|  2023 00:00  |  heart_rate         |  76      |  /min   |
+--------------------+---------------------+----------+---------+
|  2023 00:00  |  height_metric      |  167.64  |  cm     |
+--------------------+---------------------+----------+---------+
|  2023 00:00  |  height_standard    |  66      |  in     |
+--------------------+---------------------+----------+---------+
|  2023 00:00  |  o2_saturation      |  98      |  %      |
+--------------------+---------------------+----------+---------+
|  2023 00:00  |  respiration_rate   |  18      |  /min   |
+--------------------+---------------------+----------+---------+
|  2023 00:00  |  temperature_metric |  36.56   |  C      |
|                    |                     |          |         |
+--------------------+---------------------+----------+---------+
|  2023 00:00  |                     |  97.8    |  F      |
|                    | temperature_standar |          |         |
|                    | d                   |          |         |
+--------------------+---------------------+----------+---------+
|  2023 00:00  |  weight_metric      |  75      |  kg     |
+--------------------+---------------------+----------+---------+
|  2023 00:00  |  weight_standard    |  165.35  |  lb     |
+--------------------+---------------------+----------+---------+

## 2023-08-08 NOTE — XMS
Continuity of Care Document
  Created on: 2023
 
 Lani Pinzon
 External Reference #: [id number]
 : 71
 Sex: Female
 
 Demographics
 
 
+-----------------------+------------------------+
| Address               | [address]  |
|                       | [place]  [address]   |
+-----------------------+------------------------+
| Preferred Language    | Unknown                |
+-----------------------+------------------------+
| Marital Status        | Never           |
+-----------------------+------------------------+
| Anglican Affiliation | Unknown                |
+-----------------------+------------------------+
| Race                  | White                  |
+-----------------------+------------------------+
| Ethnic Group          | Not  or  |
+-----------------------+------------------------+
 
 
 Author
 
 
+--------------+--------------------------+
| Author       | [facility]                 |
+--------------+--------------------------+
| Organization | [facility]                 |
+--------------+--------------------------+
| Address      | [address] |
|              | [place]  [address]     |
+--------------+--------------------------+
| Phone        | [phone]          |
+--------------+--------------------------+
 
 
 
 Care Team Providers
 
 
+-----------------------+-------------+-------------+
| Care Team Member Name | Role        | Phone       |
+-----------------------+-------------+-------------+
 Unavailable | Unavailable |
+-----------------------+-------------+-------------+
 Unavailable | Unavailable |
+-----------------------+-------------+-------------+
 Unavailable | Unavailable |
+-----------------------+-------------+-------------+
 Unavailable | Unavailable |
+-----------------------+-------------+-------------+
 
 
 
 
 Allergies and Intolerances
 
 
+-------------+-----------------+------------+-----------------+-----------------+
|  date       |  description    |  facility  |  reaction       |  severity       |
+-------------+-----------------+------------+-----------------+-----------------+
|  (no date)  |  Aspirin        |  [facility]   |  (no reaction)  |  (no severity)  |
|             |                 | [facility]    |                 |                 |
|             |                 | Hospital   |                 |                 |
+-------------+-----------------+------------+-----------------+-----------------+
|  (no date)  |  aspirin        |  [facility]   |  (no reaction)  |  (no severity)  |
|             |                 | [facility]    |                 |                 |
|             |                 | Hospital   |                 |                 |
+-------------+-----------------+------------+-----------------+-----------------+
|  (no date)  |  Urticaria      |  [facility]   |  (no reaction)  |  (no severity)  |
|             |                 | [facility]    |                 |                 |
|             |                 | Hospital   |                 |                 |
+-------------+-----------------+------------+-----------------+-----------------+
|  (no date)  |  Clindamycin    |  [facility]   |  (no reaction)  |  (no severity)  |
|             |                 | [facility]    |                 |                 |
|             |                 | Hospital   |                 |                 |
+-------------+-----------------+------------+-----------------+-----------------+
|  (no date)  |  clindamycin    |  [facility]   |  (no reaction)  |  (no severity)  |
|             |                 | [facility]    |                 |                 |
|             |                 | Hospital   |                 |                 |
+-------------+-----------------+------------+-----------------+-----------------+
|  (no date)  |  Hydromorphone  |  [facility]   |  (no reaction)  |  (no severity)  |
|             |                 | [facility]    |                 |                 |
|             |                 | Hospital   |                 |                 |
+-------------+-----------------+------------+-----------------+-----------------+
|  (no date)  |  Clindamycin    |  [facility]   |  (no reaction)  |  (no severity)  |
|             |                 | [facility]    |                 |                 |
|             |                 | Hospital   |                 |                 |
+-------------+-----------------+------------+-----------------+-----------------+
|  (no date)  |  Aspirin        |  [facility]   |  (no reaction)  |  (no severity)  |
|             |                 | [facility]    |                 |                 |
|             |                 | Hospital   |                 |                 |
+-------------+-----------------+------------+-----------------+-----------------+
|  (no date)  |  Clindamycin    |  [facility]   |  (no reaction)  |  (no severity)  |
|             |                 | [facility]    |                 |                 |
|             |                 | Hospital   |                 |                 |
+-------------+-----------------+------------+-----------------+-----------------+
|  (no date)  |  Hydromorphone  |  [facility]   |  (no reaction)  |  (no severity)  |
|             |                 | [facility]    |                 |                 |
|             |                 | Hospital   |                 |                 |
+-------------+-----------------+------------+-----------------+-----------------+
|  (no date)  |  Penicillin     |  [facility]   |  (no reaction)  |  (no severity)  |
|             |                 | [facility]    |                 |                 |
|             |                 | Hospital   |                 |                 |
+-------------+-----------------+------------+-----------------+-----------------+
|  (no date)  |  Penicillin     |  [facility]   |  (no reaction)  |  (no severity)  |
|             |                 | [facility]    |                 |                 |
|             |                 | Hospital   |                 |                 |
+-------------+-----------------+------------+-----------------+-----------------+
|  (no date)  |  Penicillins    |  [facility]       |  (no reaction)  |  (no severity)  |
+-------------+-----------------+------------+-----------------+-----------------+
|  (no date)  |  Sulfa          |  [facility]       |  (no reaction)  |  (no severity)  |
|             | (Sulfonamide    |            |                 |                 |
 
|             | Antibiotics)    |            |                 |                 |
+-------------+-----------------+------------+-----------------+-----------------+
|  (no date)  |  aspirin        |  [facility]       |  (no reaction)  |  (no severity)  |
+-------------+-----------------+------------+-----------------+-----------------+
|  (no date)  |  clindamycin    |  [facility]       |  (no reaction)  |  (no severity)  |
+-------------+-----------------+------------+-----------------+-----------------+
|  (no date)  |  hydromorphone  |  [facility]       |  (no reaction)  |  (no severity)  |
+-------------+-----------------+------------+-----------------+-----------------+
|  (no date)  |  Penicillin     |  [facility]   |  (no reaction)  |  (no severity)  |
|             |                 | [facility]    |                 |                 |
|             |                 | Hospital   |                 |                 |
+-------------+-----------------+------------+-----------------+-----------------+
|  (no date)  |  NARCOTICS      |  [facility]       |  (no reaction)  |  (no severity)  |
+-------------+-----------------+------------+-----------------+-----------------+
|  (no date)  |  Penicillin     |  [facility]   |  (no reaction)  |  (no severity)  |
|             |                 | [facility]    |                 |                 |
|             |                 | Hospital   |                 |                 |
+-------------+-----------------+------------+-----------------+-----------------+
|  (no date)  |  Hydromorphone  |  [facility]   |  (no reaction)  |  (no severity)  |
|             |                 | [facility]    |                 |                 |
|             |                 | Hospital   |                 |                 |
+-------------+-----------------+------------+-----------------+-----------------+
|  (no date)  |  Aspirin        |  [facility]   |  (no reaction)  |  (no severity)  |
|             |                 | [facility]    |                 |                 |
|             |                 | Hospital   |                 |                 |
+-------------+-----------------+------------+-----------------+-----------------+
 
 
 
 Encounters
 No information.
 
 Functional Status
 No information.
 
 Immunizations
 No information.
 
 Medications
 
 
+--------------------+--------------------------+----------------------------+
|  date              |  description             |  facility                  |
+--------------------+--------------------------+----------------------------+
|  2023 00:00  |  OXYMETAZOLINE HCL       |  [facility]  |
+--------------------+--------------------------+----------------------------+
|  2023 00:00  |  OXYMETAZOLINE HCL       |  [facility]  |
+--------------------+--------------------------+----------------------------+
|  2023 00:00  |  Fluticasone Furoate     |  [facility]  |
+--------------------+--------------------------+----------------------------+
|  2023 00:00  |  Fluticasone Furoate     |  [facility]  |
+--------------------+--------------------------+----------------------------+
|  2022 00:00  |  NITROFURANTOIN MONOHYD  |  [facility]  |
|                    | MACROCR                  |                            |
+--------------------+--------------------------+----------------------------+
|  2023 00:00  |  LORATADINE              |  [facility]  |
+--------------------+--------------------------+----------------------------+
|  2023 00:00  |  LORATADINE              |  [facility]  |
+--------------------+--------------------------+----------------------------+
|  2022 00:00  |  HYDROCODONE             |  [facility]  |
 
|                    | BIT/ACETAMINOPHEN        |                            |
+--------------------+--------------------------+----------------------------+
 
 
 
 Problems
 
 
+--------------------+-----------------------------+----------------------------+
|  date              |  description                |  facility                  |
+--------------------+-----------------------------+----------------------------+
|  2021 00:00  |  Acute gastroenteritis      |  [facility]  |
+--------------------+-----------------------------+----------------------------+
|  2021 00:00  |  Acute gastroenteritis      |  [facility]  |
+--------------------+-----------------------------+----------------------------+
|  2022 18:20  |  PRIMARY OSTEOARTHRITIS,    |  [facility]                       |
|                    | LEFT ANKLE AND FOOT         |                            |
+--------------------+-----------------------------+----------------------------+
|  2022 18:20  |  PAIN IN LEFT ANKLE AND     |  [facility]                       |
|                    | JOINTS OF LEFT FOOT         |                            |
+--------------------+-----------------------------+----------------------------+
|  2022 00:00  |  Urinary tract infection    |  [facility]  |
+--------------------+-----------------------------+----------------------------+
|  2022 00:00  |  Urinary tract infection    |  [facility]  |
+--------------------+-----------------------------+----------------------------+
|  2022 20:48  |  NICOTINE DEPENDENCE,       |  [facility]                       |
|                    | UNSPECIFIED, UNCOMPLICATED  |                            |
+--------------------+-----------------------------+----------------------------+
|  2022 20:48  |  URINARY TRACT INFECTION,   |  [facility]                       |
|                    | SITE NOT SPECIFIED          |                            |
+--------------------+-----------------------------+----------------------------+
|  2022 20:48  |  DYSURIA                    |  [facility]                       |
+--------------------+-----------------------------+----------------------------+
|  2022 20:48  |  ALLERGY STATUS TO          |  [facility]                       |
|                    | PENICILLIN                  |                            |
+--------------------+-----------------------------+----------------------------+
|  2022 20:48  |  ALLERGY STATUS TO OTHER    |  [facility]                       |
|                    | ANTIBIOTIC AGENTS STATUS    |                            |
+--------------------+-----------------------------+----------------------------+
|  2022 20:48  |  ALLERGY STATUS TO OTH      |  [facility]                       |
|                    | DRUG/MEDS/BIOL SUBST STATUS |                            |
|                    |                             |                            |
+--------------------+-----------------------------+----------------------------+
|  2022-10-20 09:00  |  ENCNTR SCREEN MAMMOGRAM    |  [facility]                       |
|                    | FOR MALIGNANT NE            |                            |
+--------------------+-----------------------------+----------------------------+
|  2023 15:46  |  ENCOUNTER FOR OTHER        |  [facility]                       |
|                    | PREPROCEDURAL EXAMINATION   |                            |
+--------------------+-----------------------------+----------------------------+
|  2023 15:46  |  FAMILY HISTORY OF COLONIC  |  [facility]                       |
|                    | POLYPS                      |                            |
+--------------------+-----------------------------+----------------------------+
|  2023 05:45  |  NICOTINE DEPENDENCE,       |  [facility]                       |
|                    | UNSPECIFIED, UNCOMP         |                            |
+--------------------+-----------------------------+----------------------------+
|  2023 05:45  |  DVRTCLOS OF LG INT W/O     |  [facility]                       |
|                    | PERFORATION OR ABSCESS W/O  |                            |
+--------------------+-----------------------------+----------------------------+
|  2023 05:45  |  RESIDUAL HEMORRHOIDAL SKIN |  [facility]                       |
|                    |  TAGS                       |                            |
 
+--------------------+-----------------------------+----------------------------+
|  2023 05:45  |  OTHER HEMORRHOIDS          |  [facility]                       |
+--------------------+-----------------------------+----------------------------+
|  2023 05:45  |  URTICARIA, UNSPECIFIED     |  [facility]                       |
+--------------------+-----------------------------+----------------------------+
|  2023 05:45  |  OTHER SPECIFIED CONGENITAL |  [facility]                       |
|                    |  MALFORMATIONS OF INTEST    |                            |
+--------------------+-----------------------------+----------------------------+
|  2023 05:45  |  DYSLEXIA AND ALEXIA        |  [facility]                       |
+--------------------+-----------------------------+----------------------------+
|  2023 05:45  |  ENCOUNTER FOR SCREENING    |  [facility]                       |
|                    | FOR MALIGNANT NEOPLASM OF   |                            |
|                    | COLON                       |                            |
+--------------------+-----------------------------+----------------------------+
|  2023 05:45  |  OTHER LONG TERM (CURRENT)  |  [facility]                       |
|                    | DRUG THERAPY                |                            |
+--------------------+-----------------------------+----------------------------+
|  2023 05:45  |  FAMILY HISTORY OF          |  [facility]                       |
|                    | MALIGNANT NEOPLASM OF       |                            |
|                    | DIGESTIVE                   |                            |
+--------------------+-----------------------------+----------------------------+
|  2023 05:45  |  FAMILY HISTORY OF COLONIC  |  [facility]                       |
|                    | POLYPS                      |                            |
+--------------------+-----------------------------+----------------------------+
|  2023 05:45  |  ALLERGY STATUS TO OTHER    |  [facility]                       |
|                    | ANTIBIOTIC AGENTS STATUS    |                            |
+--------------------+-----------------------------+----------------------------+
|  2023 05:45  |  ALLERGY STATUS TO          |  [facility]                       |
|                    | SULFONAMIDES STATUS         |                            |
+--------------------+-----------------------------+----------------------------+
|  2023 05:45  |  ALLERGY STATUS TO OTH      |  [facility]                       |
|                    | DRUG/MEDS/BIOL SUBST STATUS |                            |
|                    |                             |                            |
+--------------------+-----------------------------+----------------------------+
 
 
 
 Procedures
 
 
+--------------------+---------------+----------------------------+
|  date              |  description  |  facility                  |
+--------------------+---------------+----------------------------+
|  2023 00:00  |  Colonoscopy  |  [facility]  |
+--------------------+---------------+----------------------------+
|  2023 00:00  |  Colonoscopy  |  [facility]  |
+--------------------+---------------+----------------------------+
 
 
 
 Results/Labs
 
 
+--------+--------+------------+---------+--------+---------+
|  test  |  date  |  facility  |  value  |  unit  |  notes  |
+--------+--------+------------+---------+--------+---------+
 
 
 
+------------------+
 
|  Result panel 1  |
+------------------+
 
 
 
+--------------+--------------+-----------+-------+-------------+-------------+
|              |  2022  |  [facility]  |  RED  |  (missing)  |  (missing)  |
| (unavailable | 21:00        | [facility]   |       |             |             |
| )            |              | Hospital  |       |             |             |
+--------------+--------------+-----------+-------+-------------+-------------+
 
 
 
+------------------+
|  Result panel 2  |
+------------------+
 
 
 
+--------------+--------------+-----------+-------------+-------------+-------------+
|              |  2022  |  [facility]  |  SL CLOUDY  |  (missing)  |  (missing)  |
| (unavailable | 21:00        | [facility]   |             |             |             |
| )            |              | Hospital  |             |             |             |
+--------------+--------------+-----------+-------------+-------------+-------------+
 
 
 
+------------------+
|  Result panel 3  |
+------------------+
 
 
 
+--------------+--------------+-----------+------------+-------------+-------------+
|              |  2022  |  [facility]  |  MODERATE  |  (missing)  |  (missing)  |
| (unavailable | 21:00        | [facility]   |            |             |             |
| )            |              | Hospital  |            |             |             |
+--------------+--------------+-----------+------------+-------------+-------------+
 
 
 
+------------------+
|  Result panel 4  |
+------------------+
 
 
 
+--------------+--------------+-----------+------------+-------------+-------------+
|              |  2022  |  [facility]  |  NEGATIVE  |  (missing)  |  (missing)  |
| (unavailable | 21:00        | [facility]   |            |             |             |
| )            |              | Hospital  |            |             |             |
+--------------+--------------+-----------+------------+-------------+-------------+
 
 
 
+------------------+
|  Result panel 5  |
+------------------+
 
 
 
 
+--------------+--------------+-----------+---------+-------------+-------------+
|              |  2022  |  [facility]  |  TRACE  |  (missing)  |  (missing)  |
| (unavailable | 21:00        | [facility]   |         |             |             |
| )            |              | Hospital  |         |             |             |
+--------------+--------------+-----------+---------+-------------+-------------+
 
 
 
+------------------+
|  Result panel 6  |
+------------------+
 
 
 
+--------------+--------------+-----------+---------+-------------+-------------+
|              |  2022  |  [facility]  |  1.025  |  (missing)  |  (missing)  |
| (unavailable | 21:00        | [facility]   |         |             |             |
| )            |              | Hospital  |         |             |             |
+--------------+--------------+-----------+---------+-------------+-------------+
 
 
 
+------------------+
|  Result panel 7  |
+------------------+
 
 
 
+--------------+--------------+-----------+-----------+-------------+-------------+
|              |  2022  |  [facility]  |  TRACE-I  |  (missing)  |  (missing)  |
| (unavailable | 21:00        | [facility]   |           |             |             |
| )            |              | Hospital  |           |             |             |
+--------------+--------------+-----------+-----------+-------------+-------------+
 
 
 
+------------------+
|  Result panel 8  |
+------------------+
 
 
 
+--------------+--------------+-----------+-------+-------------+-------------+
|              |  2022  |  [facility]  |  5.0  |  (missing)  |  (missing)  |
| (unavailable | 21:00        | [facility]   |       |             |             |
| )            |              | Hospital  |       |             |             |
+--------------+--------------+-----------+-------+-------------+-------------+
 
 
 
+------------------+
|  Result panel 9  |
+------------------+
 
 
 
+--------------+--------------+-----------+---------+-------------+-------------+
|              |  2022  |  [facility]  |  >=300  |  (missing)  |  (missing)  |
| (unavailable | 21:00        | [facility]   |         |             |             |
 
| )            |              | Hospital  |         |             |             |
+--------------+--------------+-----------+---------+-------------+-------------+
 
 
 
+-------------------+
|  Result panel 10  |
+-------------------+
 
 
 
+--------------+--------------+-----------+---------+-------------+-------------+
|              |  2022  |  [facility]  |  >=8.0  |  (missing)  |  (missing)  |
| (unavailable | 21:00        | [facility]   |         |             |             |
| )            |              | Hospital  |         |             |             |
+--------------+--------------+-----------+---------+-------------+-------------+
 
 
 
+-------------------+
|  Result panel 11  |
+-------------------+
 
 
 
+--------------+--------------+-----------+------------+-------------+-------------+
|              |  2022  |  [facility]  |  POSITIVE  |  (missing)  |  (missing)  |
| (unavailable | 21:00        | [facility]   |            |             |             |
| )            |              | Hospital  |            |             |             |
+--------------+--------------+-----------+------------+-------------+-------------+
 
 
 
+-------------------+
|  Result panel 12  |
+-------------------+
 
 
 
+--------------+--------------+-----------+---------+-------------+-------------+
|              |  2022  |  [facility]  |  SMALL  |  (missing)  |  (missing)  |
| (unavailable | 21:00        | [facility]   |         |             |             |
| )            |              | Hospital  |         |             |             |
+--------------+--------------+-----------+---------+-------------+-------------+
 
 
 
+-------------------+
|  Result panel 13  |
+-------------------+
 
 
 
+--------------+--------------+-----------+-------+-------------+-------------+
|              |  2022  |  [facility]  |  2-3  |  (missing)  |  (missing)  |
| (unavailable | 21:00        | [facility]   |       |             |             |
| )            |              | Hospital  |       |             |             |
+--------------+--------------+-----------+-------+-------------+-------------+
 
 
 
 
+-------------------+
|  Result panel 14  |
+-------------------+
 
 
 
+--------------+--------------+-----------+---------+-------------+-------------+
|              |  2022  |  [facility]  |  12-20  |  (missing)  |  (missing)  |
| (unavailable | 21:00        | [facility]   |         |             |             |
| )            |              | Hospital  |         |             |             |
+--------------+--------------+-----------+---------+-------------+-------------+
 
 
 
+-------------------+
|  Result panel 15  |
+-------------------+
 
 
 
+--------------+--------------+-----------+--------------+-------------+-------------+
|              |  2022  |  [facility]  |  SQUAMOUS 2+ |  (missing)  |  (missing)  |
| (unavailable | 21:00        | [facility]   |              |             |             |
| )            |              | Hospital  |              |             |             |
+--------------+--------------+-----------+--------------+-------------+-------------+
 
 
 
+-------------------+
|  Result panel 16  |
+-------------------+
 
 
 
+--------------+--------------+-----------+-------------+-------------+-------------+
|              |  2022  |  [facility]  |  NONE SEEN  |  (missing)  |  (missing)  |
| (unavailable | 21:00        | [facility]   |             |             |             |
| )            |              | Hospital  |             |             |             |
+--------------+--------------+-----------+-------------+-------------+-------------+
 
 
 
+-------------------+
|  Result panel 17  |
+-------------------+
 
 
 
+--------------+--------------+-----------+--------+-------------+-------------+
|              |  2022  |  [facility]  |  RARE  |  (missing)  |  (missing)  |
| (unavailable | 21:00        | [facility]   |        |             |             |
| )            |              | Hospital  |        |             |             |
+--------------+--------------+-----------+--------+-------------+-------------+
 
 
 
+-------------------+
|  Result panel 18  |
+-------------------+
 
 
 
 
+--------------+--------------+-----------+-------------+-------------+-------------+
|              |  2022  |  [facility]  |  NONE SEEN  |  (missing)  |  (missing)  |
| (unavailable | 21:00        | [facility]   |             |             |             |
| )            |              | Hospital  |             |             |             |
+--------------+--------------+-----------+-------------+-------------+-------------+
 
 
 
+-------------------+
|  Result panel 19  |
+-------------------+
 
 
 
+--------------+--------------+-----------+-------+-------------+-------------+
|              |  2022  |  [facility]  |  Yes  |  (missing)  |  (missing)  |
| (unavailable | 21:00        | [facility]   |       |             |             |
| )            |              | Hospital  |       |             |             |
+--------------+--------------+-----------+-------+-------------+-------------+
 
 
 
+-------------------+
|  Result panel 20  |
+-------------------+
 
 
 
+--------------+--------------+-----------+--------------+-------------+-------------+
|              |  2022  |  [facility]  |  CLEAN CATCH |  (missing)  |  (missing)  |
| (unavailable | 21:00        | [facility]   |              |             |             |
| )            |              | Hospital  |              |             |             |
+--------------+--------------+-----------+--------------+-------------+-------------+
 
 
 
+-------------------+
|  Result panel 21  |
+-------------------+
 
 
 
+--------------+--------------+-----------+--------+-------------+-------------+
|              |  2023  |  [facility]  |  10.0  |  (missing)  |  (missing)  |
| (unavailable | 16:53:07     | [facility]   |        |             |             |
| )            |              | Hospital  |        |             |             |
+--------------+--------------+-----------+--------+-------------+-------------+
 
 
 
+-------------------+
|  Result panel 22  |
+-------------------+
 
 
 
+--------------+--------------+-----------+--------+-------------+-------------+
 
|              |  2023  |  [facility]  |  65.6  |  (missing)  |  (missing)  |
| (unavailable | 16:53:07     | [facility]   |        |             |             |
| )            |              | Hospital  |        |             |             |
+--------------+--------------+-----------+--------+-------------+-------------+
 
 
 
+-------------------+
|  Result panel 23  |
+-------------------+
 
 
 
+--------------+--------------+-----------+--------+-------------+-------------+
|              |  2023  |  [facility]  |  23.3  |  (missing)  |  (missing)  |
| (unavailable | 16:53:07     | [facility]   |        |             |             |
| )            |              | Hospital  |        |             |             |
+--------------+--------------+-----------+--------+-------------+-------------+
 
 
 
+-------------------+
|  Result panel 24  |
+-------------------+
 
 
 
+--------------+--------------+-----------+-------+-------------+-------------+
|              |  2023  |  [facility]  |  9.4  |  (missing)  |  (missing)  |
| (unavailable | 16:53:07     | [facility]   |       |             |             |
| )            |              | Hospital  |       |             |             |
+--------------+--------------+-----------+-------+-------------+-------------+
 
 
 
+-------------------+
|  Result panel 25  |
+-------------------+
 
 
 
+--------------+--------------+-----------+-------+-------------+-------------+
|              |  2023  |  [facility]  |  1.2  |  (missing)  |  (missing)  |
| (unavailable | 16:53:07     | [facility]   |       |             |             |
| )            |              | Hospital  |       |             |             |
+--------------+--------------+-----------+-------+-------------+-------------+
 
 
 
+-------------------+
|  Result panel 26  |
+-------------------+
 
 
 
+--------------+--------------+-----------+-------+-------------+-------------+
|              |  2023  |  [facility]  |  0.5  |  (missing)  |  (missing)  |
| (unavailable | 16:53:07     | [facility]   |       |             |             |
| )            |              | Hospital  |       |             |             |
+--------------+--------------+-----------+-------+-------------+-------------+
 
 
 
 
+-------------------+
|  Result panel 27  |
+-------------------+
 
 
 
+--------------+--------------+-----------+------+---------+-------------+
|              |  2023  |  [facility]  |  88  |  mg/dL  |  (missing)  |
| (unavailable | 16:53:07     | [facility]   |      |         |             |
| )            |              | Hospital  |      |         |             |
+--------------+--------------+-----------+------+---------+-------------+
 
 
 
+-------------------+
|  Result panel 28  |
+-------------------+
 
 
 
+--------------+--------------+-----------+------+---------+-------------+
|              |  2023  |  [facility]  |  15  |  mg/dL  |  (missing)  |
| (unavailable | 16:53:07     | [facility]   |      |         |             |
| )            |              | Hospital  |      |         |             |
+--------------+--------------+-----------+------+---------+-------------+
 
 
 
+-------------------+
|  Result panel 29  |
+-------------------+
 
 
 
+--------------+--------------+-----------+--------+---------+-------------+
|              |  2023  |  [facility]  |  1.15  |  mg/dL  |  (missing)  |
| (unavailable | 16:53:07     | [facility]   |        |         |             |
| )            |              | Hospital  |        |         |             |
+--------------+--------------+-----------+--------+---------+-------------+
 
 
 
+-------------------+
|  Result panel 30  |
+-------------------+
 
 
 
+--------------+--------------+-----------+------+-------------+-------------+
|              |  2023  |  [facility]  |  58  |  (missing)  |  (missing)  |
| (unavailable | 16:53:07     | [facility]   |      |             |             |
| )            |              | Hospital  |      |             |             |
+--------------+--------------+-----------+------+-------------+-------------+
 
 
 
+-------------------+
 
|  Result panel 31  |
+-------------------+
 
 
 
+--------------+--------------+-----------+---------+-------------+-------------+
|              |  2023  |  [facility]  |  13.04  |  (missing)  |  (missing)  |
| (unavailable | 16:53:07     | [facility]   |         |             |             |
| )            |              | Hospital  |         |             |             |
+--------------+--------------+-----------+---------+-------------+-------------+
 
 
 
+-------------------+
|  Result panel 32  |
+-------------------+
 
 
 
+--------------+--------------+-----------+--------+-------------+-------------+
|              |  2023  |  [facility]  |  4.27  |  (missing)  |  (missing)  |
| (unavailable | 16:53:07     | [facility]   |        |             |             |
| )            |              | Hospital  |        |             |             |
+--------------+--------------+-----------+--------+-------------+-------------+
 
 
 
+-------------------+
|  Result panel 33  |
+-------------------+
 
 
 
+--------------+--------------+-----------+-------+-------------+-------------+
|              |  2023  |  [facility]  |  143  |  (missing)  |  (missing)  |
| (unavailable | 16:53:07     | [facility]   |       |             |             |
| )            |              | Hospital  |       |             |             |
+--------------+--------------+-----------+-------+-------------+-------------+
 
 
 
+-------------------+
|  Result panel 34  |
+-------------------+
 
 
 
+--------------+--------------+-----------+-------+-------------+-------------+
|              |  2023  |  [facility]  |  3.5  |  (missing)  |  (missing)  |
| (unavailable | 16:53:07     | [facility]   |       |             |             |
| )            |              | Hospital  |       |             |             |
+--------------+--------------+-----------+-------+-------------+-------------+
 
 
 
+-------------------+
|  Result panel 35  |
+-------------------+
 
 
 
 
+--------------+--------------+-----------+-------+-------------+-------------+
|              |  2023  |  [facility]  |  103  |  (missing)  |  (missing)  |
| (unavailable | 16:53:07     | [facility]   |       |             |             |
| )            |              | Hospital  |       |             |             |
+--------------+--------------+-----------+-------+-------------+-------------+
 
 
 
+-------------------+
|  Result panel 36  |
+-------------------+
 
 
 
+--------------+--------------+-----------+------+-------------+-------------+
|              |  2023  |  [facility]  |  29  |  (missing)  |  (missing)  |
| (unavailable | 16:53:07     | [facility]   |      |             |             |
| )            |              | Hospital  |      |             |             |
+--------------+--------------+-----------+------+-------------+-------------+
 
 
 
+-------------------+
|  Result panel 37  |
+-------------------+
 
 
 
+--------------+--------------+-----------+--------+-------------+-------------+
|              |  2023  |  [facility]  |  14.5  |  (missing)  |  (missing)  |
| (unavailable | 16:53:07     | [facility]   |        |             |             |
| )            |              | Hospital  |        |             |             |
+--------------+--------------+-----------+--------+-------------+-------------+
 
 
 
+-------------------+
|  Result panel 38  |
+-------------------+
 
 
 
+--------------+--------------+-----------+-------+---------+-------------+
|              |  2023  |  [facility]  |  8.7  |  mg/dL  |  (missing)  |
| (unavailable | 16:53:07     | [facility]   |       |         |             |
| )            |              | Hospital  |       |         |             |
+--------------+--------------+-----------+-------+---------+-------------+
 
 
 
+-------------------+
|  Result panel 39  |
+-------------------+
 
 
 
+--------------+--------------+-----------+-------+-------------+-------------+
|              |  2023  |  [facility]  |  7.6  |  (missing)  |  (missing)  |
| (unavailable | 16:53:07     | [facility]   |       |             |             |
 
| )            |              | Hospital  |       |             |             |
+--------------+--------------+-----------+-------+-------------+-------------+
 
 
 
+-------------------+
|  Result panel 40  |
+-------------------+
 
 
 
+--------------+--------------+-----------+-------+-------------+-------------+
|              |  2023  |  [facility]  |  3.9  |  (missing)  |  (missing)  |
| (unavailable | 16:53:07     | [facility]   |       |             |             |
| )            |              | Hospital  |       |             |             |
+--------------+--------------+-----------+-------+-------------+-------------+
 
 
 
+-------------------+
|  Result panel 41  |
+-------------------+
 
 
 
+--------------+--------------+-----------+-------+-------------+-------------+
|              |  2023  |  [facility]  |  3.7  |  (missing)  |  (missing)  |
| (unavailable | 16:53:07     | [facility]   |       |             |             |
| )            |              | Hospital  |       |             |             |
+--------------+--------------+-----------+-------+-------------+-------------+
 
 
 
+-------------------+
|  Result panel 42  |
+-------------------+
 
 
 
+--------------+--------------+-----------+--------+-------------+-------------+
|              |  2023  |  [facility]  |  1.05  |  (missing)  |  (missing)  |
| (unavailable | 16:53:07     | [facility]   |        |             |             |
| )            |              | Hospital  |        |             |             |
+--------------+--------------+-----------+--------+-------------+-------------+
 
 
 
+-------------------+
|  Result panel 43  |
+-------------------+
 
 
 
+--------------+--------------+-----------+--------+-------------+-------------+
|              |  2023  |  [facility]  |  13.4  |  (missing)  |  (missing)  |
| (unavailable | 16:53:07     | [facility]   |        |             |             |
| )            |              | Hospital  |        |             |             |
+--------------+--------------+-----------+--------+-------------+-------------+
 
 
 
 
+-------------------+
|  Result panel 44  |
+-------------------+
 
 
 
+--------------+--------------+-----------+-------+-------------+-------------+
|              |  2023  |  [facility]  |  0.4  |  (missing)  |  (missing)  |
| (unavailable | 16:53:07     | [facility]   |       |             |             |
| )            |              | Hospital  |       |             |             |
+--------------+--------------+-----------+-------+-------------+-------------+
 
 
 
+-------------------+
|  Result panel 45  |
+-------------------+
 
 
 
+--------------+--------------+-----------+------+-------------+-------------+
|              |  2023  |  [facility]  |  18  |  (missing)  |  (missing)  |
| (unavailable | 16:53:07     | [facility]   |      |             |             |
| )            |              | Hospital  |      |             |             |
+--------------+--------------+-----------+------+-------------+-------------+
 
 
 
+-------------------+
|  Result panel 46  |
+-------------------+
 
 
 
+--------------+--------------+-----------+------+-------------+-------------+
|              |  2023  |  [facility]  |  30  |  (missing)  |  (missing)  |
| (unavailable | 16:53:07     | [facility]   |      |             |             |
| )            |              | Hospital  |      |             |             |
+--------------+--------------+-----------+------+-------------+-------------+
 
 
 
+-------------------+
|  Result panel 47  |
+-------------------+
 
 
 
+--------------+--------------+-----------+------+-------------+-------------+
|              |  2023  |  [facility]  |  97  |  (missing)  |  (missing)  |
| (unavailable | 16:53:07     | [facility]   |      |             |             |
| )            |              | Hospital  |      |             |             |
+--------------+--------------+-----------+------+-------------+-------------+
 
 
 
+-------------------+
|  Result panel 48  |
+-------------------+
 
 
 
 
+--------------+--------------+-----------+--------+-------------+-------------+
|              |  2023  |  [facility]  |  39.1  |  (missing)  |  (missing)  |
| (unavailable | 16:53:07     | [facility]   |        |             |             |
| )            |              | Hospital  |        |             |             |
+--------------+--------------+-----------+--------+-------------+-------------+
 
 
 
+-------------------+
|  Result panel 49  |
+-------------------+
 
 
 
+--------------+--------------+-----------+--------+-------------+-------------+
|              |  2023  |  [facility]  |  91.6  |  (missing)  |  (missing)  |
| (unavailable | 16:53:07     | [facility]   |        |             |             |
| )            |              | Hospital  |        |             |             |
+--------------+--------------+-----------+--------+-------------+-------------+
 
 
 
+-------------------+
|  Result panel 50  |
+-------------------+
 
 
 
+--------------+--------------+-----------+--------+-------------+-------------+
|              |  2023  |  [facility]  |  31.5  |  (missing)  |  (missing)  |
| (unavailable | 16:53:07     | [facility]   |        |             |             |
| )            |              | Hospital  |        |             |             |
+--------------+--------------+-----------+--------+-------------+-------------+
 
 
 
+-------------------+
|  Result panel 51  |
+-------------------+
 
 
 
+--------------+--------------+-----------+--------+-------------+-------------+
|              |  2023  |  [facility]  |  34.3  |  (missing)  |  (missing)  |
| (unavailable | 16:53:07     | [facility]   |        |             |             |
| )            |              | Hospital  |        |             |             |
+--------------+--------------+-----------+--------+-------------+-------------+
 
 
 
+-------------------+
|  Result panel 52  |
+-------------------+
 
 
 
+--------------+--------------+-----------+--------+-------------+-------------+
 
|              |  2023  |  [facility]  |  13.1  |  (missing)  |  (missing)  |
| (unavailable | 16:53:07     | [facility]   |        |             |             |
| )            |              | Hospital  |        |             |             |
+--------------+--------------+-----------+--------+-------------+-------------+
 
 
 
+-------------------+
|  Result panel 53  |
+-------------------+
 
 
 
+--------------+--------------+-----------+-------+-------------+-------------+
|              |  2023  |  [facility]  |  329  |  (missing)  |  (missing)  |
| (unavailable | 16:53:07     | [facility]   |       |             |             |
| )            |              | Hospital  |       |             |             |
+--------------+--------------+-----------+-------+-------------+-------------+
 
 
 
 Social History
 No information.
 
 Vital Signs
 
 
+--------------------+---------------------+----------+---------+
|  date              |  measurement        |  value   |  units  |
+--------------------+---------------------+----------+---------+
|  2022 00:00  |  BMI                |  31.2    |  kg/m2  |
+--------------------+---------------------+----------+---------+
|  2022 00:00  |  BP_diastolic       |  51      |  mmHg   |
+--------------------+---------------------+----------+---------+
|  2022 00:00  |  BP_systolic        |  106     |  mmHg   |
+--------------------+---------------------+----------+---------+
|  2022 00:00  |  heart_rate         |  71      |  /min   |
+--------------------+---------------------+----------+---------+
|  2022 00:00  |  height_metric      |  152.4   |  cm     |
+--------------------+---------------------+----------+---------+
|  2022 00:00  |  height_standard    |  60      |  in     |
+--------------------+---------------------+----------+---------+
|  2022 00:00  |  o2_saturation      |  96      |  %      |
+--------------------+---------------------+----------+---------+
|  2022 00:00  |  respiration_rate   |  16      |  /min   |
+--------------------+---------------------+----------+---------+
|  2022 00:00  |  temperature_metric |  37.22   |  C      |
|                    |                     |          |         |
+--------------------+---------------------+----------+---------+
|  2022 00:00  |                     |  99      |  F      |
|                    | temperature_standar |          |         |
|                    | d                   |          |         |
+--------------------+---------------------+----------+---------+
|  2022 00:00  |  weight_metric      |  72.57   |  kg     |
+--------------------+---------------------+----------+---------+
|  2022 00:00  |  weight_standard    |  159.99  |  lb     |
+--------------------+---------------------+----------+---------+
|  2022 00:00  |  weight_standard    |  160     |  lb     |
+--------------------+---------------------+----------+---------+
|  2023 00:00  |  BMI                |  25.9    |  kg/m2  |
 
+--------------------+---------------------+----------+---------+
|  2023 00:00  |  height_metric      |  167.64  |  cm     |
+--------------------+---------------------+----------+---------+
|  2023 00:00  |  height_standard    |  66      |  in     |
+--------------------+---------------------+----------+---------+
|  2023 00:00  |  weight_metric      |  72.7    |  kg     |
+--------------------+---------------------+----------+---------+
|  2023 00:00  |  weight_standard    |  160.28  |  lb     |
+--------------------+---------------------+----------+---------+
|  2023 00:00  |  BP_diastolic       |  51      |  mmHg   |
+--------------------+---------------------+----------+---------+
|  2023 00:00  |  BP_systolic        |  110     |  mmHg   |
+--------------------+---------------------+----------+---------+
|  2023 00:00  |  heart_rate         |  69      |  /min   |
+--------------------+---------------------+----------+---------+
|  2023 00:00  |  o2_saturation      |  100     |  %      |
+--------------------+---------------------+----------+---------+
|  2023 00:00  |  respiration_rate   |  14      |  /min   |
+--------------------+---------------------+----------+---------+
|  2023 00:00  |  temperature_metric |  36.89   |  C      |
|                    |                     |          |         |
+--------------------+---------------------+----------+---------+
|  2023 00:00  |                     |  98.4    |  F      |
|                    | temperature_standar |          |         |
|                    | d                   |          |         |
+--------------------+---------------------+----------+---------+
|  2023 00:00  |  BMI                |  26.7    |  kg/m2  |
+--------------------+---------------------+----------+---------+
|  2023 00:00  |  BP_diastolic       |  84      |  mmHg   |
+--------------------+---------------------+----------+---------+
|  2023 00:00  |  BP_systolic        |  103     |  mmHg   |
+--------------------+---------------------+----------+---------+
|  2023 00:00  |  heart_rate         |  76      |  /min   |
+--------------------+---------------------+----------+---------+
|  2023 00:00  |  height_metric      |  167.64  |  cm     |
+--------------------+---------------------+----------+---------+
|  2023 00:00  |  height_standard    |  66      |  in     |
+--------------------+---------------------+----------+---------+
|  2023 00:00  |  o2_saturation      |  98      |  %      |
+--------------------+---------------------+----------+---------+
|  2023 00:00  |  respiration_rate   |  18      |  /min   |
+--------------------+---------------------+----------+---------+
|  2023 00:00  |  temperature_metric |  36.56   |  C      |
|                    |                     |          |         |
+--------------------+---------------------+----------+---------+
|  2023 00:00  |                     |  97.8    |  F      |
|                    | temperature_standar |          |         |
|                    | d                   |          |         |
+--------------------+---------------------+----------+---------+
|  2023 00:00  |  weight_metric      |  75      |  kg     |
+--------------------+---------------------+----------+---------+
|  2023 00:00  |  weight_standard    |  165.35  |  lb     |
+--------------------+---------------------+----------+---------+

## 2023-08-08 NOTE — XMS
CLINIC FOLLOW UP REPORT    HISTORY OF PRESENT ILLNESS:  Analisa Melendez is a 48 year old female who returns for follow of Crohn's ileocolitis with a history of segmental right colon resection with ileocolonic anastomosis. Clinically stable on azathioprine. No side effects from medications. No nausea or vomiting or abdominal pain. Colonoscopy last year without evidence of active inflammation. No nausea or vomiting. Weight is stable. No melena or hematochezia. Sometimes the loose stools but only showed good of time. Occasional right lower back pain with radiation to the flank which appears to be more positionally related but overall symptoms are mild.         PAST MEDICAL HISTORY:  Past Medical History:   Diagnosis Date   • Crohn's disease (CMS/HCC)    • Failed moderate sedation during procedure     woke up during the last colonoscopy   • History of colonoscopy due 10/2017   • PONV (postoperative nausea and vomiting)    • UTI (urinary tract infection)     after intercourse       MEDICATIONS:  Current Outpatient Prescriptions   Medication Sig Dispense Refill   • azaTHIOprine (IMURAN) 50 MG tablet TAKE 1 & 1/2 TABLETS BY MOUTH EVERY DAY 45 tablet 5   • Cyanocobalamin (VITAMIN B-12 PO) Take 1 tablet by mouth daily.       No current facility-administered medications for this visit.        ALLERGIES:  ALLERGIES:  No Known Allergies    SOCIAL HISTORY:  She  reports that she has never smoked. She has never used smokeless tobacco. She reports that she drinks about 1.2 oz of alcohol per week . She reports that she does not use drugs.    FAMILY HISTORY:  Family History   Problem Relation Age of Onset   • Adopted: Yes   • Breast cancer Mother    • Breast cancer Maternal Grandmother    • Breast cancer Paternal Aunt    • Cancer Son      testicular       REVIEW OF SYSTEMS:  CARDIOVASCULAR:  Without chest pain, palpitations, syncope.  RESPIRATORY:  Without cough, sputum, dyspnea.  GENITOURINARY:  Denies nocturia, urgency, dysuria,  PreManage Notification: CAROL DESIR MRN:[id number]
 
Security Information
 
Security Events
No recent Security Events currently on file
 
 
 
CRITERIA MET
------------
- [facility] - 2 Visits in 30 Days
 
 
CARE PROVIDERS
-------------------------------------------------------------------------------------
-[facility]-     Dentist: General Practice     [facility]
 
PHONE: [phone]
-------------------------------------------------------------------------------------
Gladys Harrison     Nurse Practitioner: Family     Current
 
PHONE: [phone]
-------------------------------------------------------------------------------------
Dewey Lee DO     Family Medicine     Current
 
PHONE: Unknown
-------------------------------------------------------------------------------------
 
[facility] has no Care Guidelines for this patient.
 
ECHANTAL VISIT COUNT (12 MO.)
-------------------------------------------------------------------------------------
2 [facility]
-------------------------------------------------------------------------------------
TOTAL 2
-------------------------------------------------------------------------------------
NOTE: Visits indicate total known visits.
 
ED/UCC VISIT TRACKING (12 MO.)
-------------------------------------------------------------------------------------
08/08/2023 14:35
[facility]
 
TYPE: Emergency
 
COMPLAINT:
- RT LEG SWELLING
-------------------------------------------------------------------------------------
08/07/2023 21:25
[facility]
 
TYPE: Emergency
 
COMPLAINT:
- RT KNEE INJURY
-------------------------------------------------------------------------------------
 
 
 
INPATIENT VISIT TRACKING (12 MO.)
No inpatient visits to display in this time frame
 
[email or web address] frequency, hesitancy, hematuria, infections or stones.  MUSCULOSKELETAL:  No myalgias, arthralgias.  NEUROLOGIC:  No headaches, numbness, tingling, weakness.    PHYSICAL EXAM:  Visit Vitals  /70   Pulse 60   Ht 5' 5\" (1.651 m)   Wt 67.4 kg   BMI 24.73 kg/m²      Body mass index is 24.73 kg/m².  General:  appropriate, cooperative and not in acute distress.  Skin:  no jaundice on inspection.  Skin is warm and dry on palpation  Eyes:  Normal conjunctiva and lids  Head: Normocephalic, Non-traumatic, No external ear or nasal discharge.  Lungs: clear to auscultation.  Heart: Regular rate and rhythm on auscultation  Abdomen: Soft, not tender, No palpable masses or hepatosplenomegaly.  Bowel sounds present.  Extremities: Without cyanosis or edema.  Neuro: grossly intact without focal deficit apparent.  Alert and oriented x3.  Mood and affect appropriate.    LABORATORY DATA:     Lab Results   Component Value Date    SODIUM 144 07/10/2017    POTASSIUM 4.6 07/10/2017    CHLORIDE 106 07/10/2017    CO2 27 07/10/2017    GLUCOSE 90 07/10/2017    BUN 15 07/10/2017    CREATININE 0.70 07/10/2017    ALBUMIN 4.3 07/10/2017    BILIRUBIN 0.5 07/10/2017    AST 14 07/10/2017     Lab Results   Component Value Date    WBC 6.9 07/10/2017    HGB 13.2 07/10/2017    HCT 40.0 07/10/2017     07/10/2017    CRP <0.3 07/10/2017    TSH 1.452 08/14/2015       Imaging:  No orders to display        ASSESSMENT:  Crohn's ileocolitis with a history of segmental right colon resection with ileocolonic anastomosis-stable on azathioprine    PLAN:  Continue azathioprine  Labs today  Referral to pain management for low back pain was offered for the patient but she is deferring that at this point  Colonoscopy next year    Orders Placed This Encounter   • CBC & Auto Differential   • Comprehensive Metabolic Panel   • C Reactive Protein